# Patient Record
Sex: FEMALE | Race: WHITE | NOT HISPANIC OR LATINO | Employment: OTHER | URBAN - METROPOLITAN AREA
[De-identification: names, ages, dates, MRNs, and addresses within clinical notes are randomized per-mention and may not be internally consistent; named-entity substitution may affect disease eponyms.]

---

## 2017-08-17 ENCOUNTER — GENERIC CONVERSION - ENCOUNTER (OUTPATIENT)
Dept: OTHER | Facility: OTHER | Age: 77
End: 2017-08-17

## 2018-11-14 ENCOUNTER — OFFICE VISIT (OUTPATIENT)
Dept: GYNECOLOGY | Facility: CLINIC | Age: 78
End: 2018-11-14
Payer: COMMERCIAL

## 2018-11-14 VITALS
WEIGHT: 153.6 LBS | HEIGHT: 60 IN | SYSTOLIC BLOOD PRESSURE: 150 MMHG | DIASTOLIC BLOOD PRESSURE: 90 MMHG | BODY MASS INDEX: 30.15 KG/M2

## 2018-11-14 DIAGNOSIS — Z01.419 ENCOUNTER FOR ANNUAL ROUTINE GYNECOLOGICAL EXAMINATION: Primary | ICD-10-CM

## 2018-11-14 DIAGNOSIS — N95.2 VAGINAL ATROPHY: ICD-10-CM

## 2018-11-14 PROCEDURE — G0101 CA SCREEN;PELVIC/BREAST EXAM: HCPCS | Performed by: NURSE PRACTITIONER

## 2018-11-14 RX ORDER — ESTRADIOL 0.1 MG/G
0.5 CREAM VAGINAL 2 TIMES WEEKLY
Qty: 42.5 G | Refills: 3 | Status: SHIPPED | OUTPATIENT
Start: 2018-11-15

## 2018-11-14 RX ORDER — FLUTICASONE PROPIONATE 50 MCG
SPRAY, SUSPENSION (ML) NASAL
COMMUNITY

## 2018-11-14 RX ORDER — DIPHENOXYLATE HYDROCHLORIDE AND ATROPINE SULFATE 2.5; .025 MG/1; MG/1
1 TABLET ORAL DAILY
COMMUNITY

## 2018-11-14 RX ORDER — ESTRADIOL 0.1 MG/G
0.5 CREAM VAGINAL 2 TIMES WEEKLY
Qty: 42.5 G | Refills: 3 | Status: SHIPPED | OUTPATIENT
Start: 2018-11-15 | End: 2018-11-14 | Stop reason: SDUPTHER

## 2018-11-14 RX ORDER — VALSARTAN 80 MG/1
80 TABLET ORAL DAILY
Refills: 3 | COMMUNITY
Start: 2018-10-22

## 2018-11-14 NOTE — PROGRESS NOTES
Tra Velasquez is a 70 y o  female who presents for annual exam  The patient is post menopausal and voices no complaints today  The patient is not currently sexually active  GYN screening history: last pap: was normal and last mammogram: was normal  The patient is not taking hormone replacement therapy  Patient denies post-menopausal vaginal bleeding      The patient participates in regular exercise: no  She relates that she stopped use of Vagifem 2017 and will occasionally feel dryness  History of abnormal Pap smear: no  Family history of breast cancer: no  Past Medical History:   Diagnosis Date    Arthritis     Carpal tunnel syndrome     RIGHT HAND    Hypertension     Osteopenia     Sinus problem      Family History   Problem Relation Age of Onset    Heart disease Mother     Hyperlipidemia Mother     Hypertension Mother     Lymphoma Father         NON HODGKINS    Heart disease Sister     Kidney cancer Brother         AND BLADDER CANCER    Breast cancer Daughter      Past Surgical History:   Procedure Laterality Date     SECTION      3 C-SECTIONS    COLONOSCOPY      CYST REMOVAL  1997    FINGER     Social History     Social History    Marital status:      Spouse name: N/A    Number of children: N/A    Years of education: N/A     Occupational History    Not on file       Social History Main Topics    Smoking status: Never Smoker    Smokeless tobacco: Never Used    Alcohol use No    Drug use: No    Sexual activity: No     Other Topics Concern    Not on file     Social History Narrative    No narrative on file       Current Outpatient Prescriptions:     Calcium Carbonate (CALCIUM 600 PO), Take 900 mg by mouth, Disp: , Rfl:     multivitamin (THERAGRAN) TABS, Take 1 tablet by mouth daily, Disp: , Rfl:     Pyridoxine HCl (B6 NATURAL PO), Take by mouth, Disp: , Rfl:     Cetirizine HCl (ZYRTEC ALLERGY) 10 MG CAPS, Take by mouth, Disp: , Rfl:    [START ON 11/15/2018] estradiol (ESTRACE) 0 1 mg/g vaginal cream, Insert 0 5 g into the vagina 2 (two) times a week, Disp: 42 5 g, Rfl: 3    fluticasone (FLONASE) 50 mcg/act nasal spray, into each nostril, Disp: , Rfl:     valsartan (DIOVAN) 80 mg tablet, Take 80 mg by mouth daily, Disp: , Rfl: 3      Review of Systems  Constitutional :no fever, feels well, no tiredness, no recent weight gain or loss  Cardiovascular: no complaints of slow or fast heart beat, no chest pain, no palpitations  Respiratory: no complaints of shortness of shortness of breath, no BURNS  Breasts:no complaints of breast pain, breast lump, or nipple discharge  Gastrointestinal: no complaints of abdominal pain, constipation,nausea, vomiting, or diarrhea or bloody stools  Genitourinary : no complaints of dysuria, incontinence, pelvic pain, dysmenorrhea,vaginal discharge or abnormal vaginal bleeding  Integumentary: no complaints of skin rash or lesion,itching or dry skin  Neurological: no complaints of headache,numbness, tingling, dizziness or fainting       Objective      /90 (BP Location: Right arm)   Ht 5' 0 05" (1 525 m)   Wt 69 7 kg (153 lb 9 6 oz)   BMI 29 95 kg/m²     General:   appears stated age","cooperative","alert" normal mood and affect   Neck: Neck: normal, supple,trachea midline, no masses   Heart: regular rate and rhythm, S1, S2 normal, no murmur, click, rub or gallop   Lungs: clear to auscultation bilaterally   Breasts: Breast exam :normal, no dimpling or skin changes noted   Abdomen: soft, non-tender, without masses or organomegaly   Vulva: Normal , no lesions   Vagina: normal , no lesions   Atrophic changes   Urethra: normal   Cervix: Normal, no palpable masses A pap smear was not done   Uterus: Normal , non-tender,not enlarged,no palpable masses   Adnexa: Normal, non-tender without fullness or masses                         Assessment     Normal GYN exam  Vulvovaginal atrophy     Plan      All questions answered  Breast self exam technique reviewed and patient encouraged to perform self-exam monthly  Dietary diary  Follow up as needed  Mammogram   The pt  had a normal Dexa scan done 2/2018 which did show osteopenia  She is UTD with her colonoscopy  We discussed her atrophy   She will try the use of estrogen cream at least once/week

## 2018-12-17 DIAGNOSIS — N95.2 VAGINAL ATROPHY: ICD-10-CM
